# Patient Record
Sex: MALE | Race: BLACK OR AFRICAN AMERICAN | NOT HISPANIC OR LATINO | Employment: FULL TIME | URBAN - METROPOLITAN AREA
[De-identification: names, ages, dates, MRNs, and addresses within clinical notes are randomized per-mention and may not be internally consistent; named-entity substitution may affect disease eponyms.]

---

## 2019-12-23 ENCOUNTER — RECORDS - HEALTHEAST (OUTPATIENT)
Dept: LAB | Facility: CLINIC | Age: 34
End: 2019-12-23

## 2019-12-26 LAB
GAMMA INTERFERON BACKGROUND BLD IA-ACNC: 0.14 IU/ML
M TB IFN-G BLD-IMP: NEGATIVE
MITOGEN IGNF BCKGRD COR BLD-ACNC: 0.17 IU/ML
MITOGEN IGNF BCKGRD COR BLD-ACNC: 0.2 IU/ML
QTF INTERPRETATION: NORMAL
QTF MITOGEN - NIL: 6.81 IU/ML

## 2024-05-31 ENCOUNTER — LAB REQUISITION (OUTPATIENT)
Dept: LAB | Facility: CLINIC | Age: 39
End: 2024-05-31

## 2024-05-31 PROCEDURE — 36415 COLL VENOUS BLD VENIPUNCTURE: CPT | Performed by: INTERNAL MEDICINE

## 2024-05-31 PROCEDURE — 86481 TB AG RESPONSE T-CELL SUSP: CPT | Performed by: INTERNAL MEDICINE

## 2024-06-02 LAB
GAMMA INTERFERON BACKGROUND BLD IA-ACNC: 0.14 IU/ML
M TB IFN-G BLD-IMP: NEGATIVE
M TB IFN-G CD4+ BCKGRND COR BLD-ACNC: 9.86 IU/ML
MITOGEN IGNF BCKGRD COR BLD-ACNC: 0.07 IU/ML
MITOGEN IGNF BCKGRD COR BLD-ACNC: 0.2 IU/ML
QUANTIFERON MITOGEN: 10 IU/ML
QUANTIFERON NIL TUBE: 0.14 IU/ML
QUANTIFERON TB1 TUBE: 0.34 IU/ML
QUANTIFERON TB2 TUBE: 0.21

## 2024-12-03 NOTE — PROGRESS NOTES
"HPI:  Patient presents for evaluation of bump on right upper lid. The bump on the right upper lid has been present for 5 months. There is no pain. There is some discharge coming out of the bump right eye. The patient had a full exam at [facility] 3 months ago - was told the bump was \"abnormal.\"      Pertinent Medical History:  N/A    Ocular History:  None    Eye Medications:  None    Assessment and Plan:    #   Ulcerating Lesion, right upper lid.   Warm compress (with dry eye mask), 4 times daily, right eye. Then decrease to 4 times daily.  Erythromycin 4 times daily, on the eyelid, right upper lid. Start 01/02/2025.    #   Central Corneal Scar, left eye.   Sees 20/20 right eye and 20/20 left eye.  Though scar is likely mildly visually significant given central scarring (very faint).      #   Dry Eye Syndrome, both eyes.  Symptoms of dry eyes include blurry vision, eye pain, grittiness, burning, redness, eye irritation, light sensitivity, and tearing. The goal is not to eliminate, but to improve symptoms.   Preservative free artificial tears 4 times daily both eyes. Refresh or Systane. Can use up to every 2 hours both eyes as needed.    Keep eye drops and ointments 5 minutes apart.           Patient consented to a dilated eye exam:    Yes. Side effects discussed.    Medical History:  No past medical history on file.    Medications:  No current outpatient medications on file.   Complete documentation of historical and exam elements from today's encounter can be found in the full encounter summary report (not reduplicated in this progress note). I personally obtained the chief complaint(s) and history of present illness.  I confirmed and edited as necessary the review of systems, past medical/surgical history, family history, social history, and examination findings as documented by others; and I examined the patient myself. I personally reviewed the relevant tests, images, and reports as documented above. I " formulated and edited as necessary the assessment and plan and discussed the findings and management plan with the patient and family. - Andrea Jeffery OD

## 2025-01-02 ENCOUNTER — OFFICE VISIT (OUTPATIENT)
Dept: OPHTHALMOLOGY | Facility: CLINIC | Age: 40
End: 2025-01-02
Attending: OPTOMETRIST
Payer: COMMERCIAL

## 2025-01-02 DIAGNOSIS — H04.123 DRY EYE SYNDROME OF BOTH EYES: ICD-10-CM

## 2025-01-02 DIAGNOSIS — H17.9 CORNEAL SCAR, LEFT EYE: ICD-10-CM

## 2025-01-02 DIAGNOSIS — H02.9 LESION OF EYELID: Primary | ICD-10-CM

## 2025-01-02 PROCEDURE — 99211 OFF/OP EST MAY X REQ PHY/QHP: CPT | Performed by: OPTOMETRIST

## 2025-01-02 RX ORDER — ERYTHROMYCIN 5 MG/G
0.5 OINTMENT OPHTHALMIC 4 TIMES DAILY
Qty: 10 G | Refills: 3 | Status: SHIPPED | OUTPATIENT
Start: 2025-01-02 | End: 2025-01-16

## 2025-01-02 RX ORDER — NEOMYCIN SULFATE, POLYMYXIN B SULFATE, AND DEXAMETHASONE 3.5; 10000; 1 MG/G; [USP'U]/G; MG/G
0.5 OINTMENT OPHTHALMIC 4 TIMES DAILY
Qty: 10 G | Refills: 1 | Status: CANCELLED | OUTPATIENT
Start: 2025-01-02 | End: 2025-01-16

## 2025-01-02 RX ORDER — CARBOXYMETHYLCELLULOSE SODIUM 5 MG/ML
1 SOLUTION/ DROPS OPHTHALMIC 4 TIMES DAILY
Qty: 400 EACH | Refills: 11 | Status: SHIPPED | OUTPATIENT
Start: 2025-01-02

## 2025-01-02 ASSESSMENT — CONF VISUAL FIELD
OD_INFERIOR_NASAL_RESTRICTION: 0
OS_INFERIOR_TEMPORAL_RESTRICTION: 0
OD_SUPERIOR_NASAL_RESTRICTION: 0
OS_INFERIOR_NASAL_RESTRICTION: 0
OD_SUPERIOR_TEMPORAL_RESTRICTION: 0
OS_SUPERIOR_TEMPORAL_RESTRICTION: 0
OD_INFERIOR_TEMPORAL_RESTRICTION: 0
OD_NORMAL: 1
METHOD: COUNTING FINGERS
OS_NORMAL: 1
OS_SUPERIOR_NASAL_RESTRICTION: 0

## 2025-01-02 ASSESSMENT — TONOMETRY
IOP_METHOD: TONOPEN
OD_IOP_MMHG: 13
OS_IOP_MMHG: 13

## 2025-01-02 ASSESSMENT — REFRACTION_MANIFEST
OD_CYLINDER: SPHERE
OS_SPHERE: PLANO
OS_CYLINDER: SPHERE
OD_SPHERE: PLANO

## 2025-01-02 ASSESSMENT — EXTERNAL EXAM - LEFT EYE: OS_EXAM: NORMAL

## 2025-01-02 ASSESSMENT — VISUAL ACUITY
OS_SC: 20/20
OD_SC: 20/20
METHOD: SNELLEN - LINEAR

## 2025-01-02 ASSESSMENT — SLIT LAMP EXAM - LIDS: COMMENTS: NORMAL

## 2025-01-02 ASSESSMENT — EXTERNAL EXAM - RIGHT EYE: OD_EXAM: NORMAL

## 2025-01-02 NOTE — NURSING NOTE
Chief Complaints and History of Present Illnesses   Patient presents with    Bump on right upper lid evaluation     Chief Complaint(s) and History of Present Illness(es)       Bump on right upper lid evaluation               Comments    Patient states here for an evaluation for bump on upper eye lid of right eye. Last eye exam was about two to three months ago at [facility]. No DM. No past ocular surgeries or injuries.     Patient has noticed for the past four to five months bothered by bump on upper lid of right eye. No ocular pain currently. Some irritation, rubs it and gets some pain then. Discharge clear most of the time, but occasionally milky white. Lashes stick together at times. Does use a saline solution to clear and unstick. Does notice some blurriness when that happens right eye. Normally does not wear any correction, just clear lenses for protection at times. No floaters or flashes of light. Redness noticed front of both eyes. No vision issues left eye.    Marichuy Braun on 1/2/2025 at 8:31 AM

## 2025-01-18 ENCOUNTER — HEALTH MAINTENANCE LETTER (OUTPATIENT)
Age: 40
End: 2025-01-18

## 2025-01-27 ENCOUNTER — TELEPHONE (OUTPATIENT)
Dept: OPHTHALMOLOGY | Facility: CLINIC | Age: 40
End: 2025-01-27
Payer: COMMERCIAL

## 2025-01-27 NOTE — TELEPHONE ENCOUNTER
Worsening eye symptoms in past 1-2 weeks    Recommended coming in for an exam and offered appt tomorrow at 10 AM with Dr. Thornton at [facility] location and pt confirmed appt.    Yonis Narvaez RN 2:48 PM 01/27/25

## 2025-01-27 NOTE — TELEPHONE ENCOUNTER
[facility] Call Center    Phone Message    May a detailed message be left on voicemail: yes     Reason for Call: Symptoms or Concerns     If patient has red-flag symptoms, warm transfer to triage line    Current symptom or concern: Eye keeps watering, very red, white part of eye very red, pt has to constantly wipe eye from tearing. Discharge coming from eye. RIGHT eye.     Symptoms have been present for:  2 week(s)     Has patient previously been seen for this? Yes    By: Dr. Disla    Date: 01/02/2025    Are there any new or worsening symptoms? Yes: All symptoms have all become non stop. Pt is constantly dealing with these issue. Eye is becoming more irritated as pt has to constantly touch it.     Action Taken: Message routed to:  [facility] ([facility] Eye    Travel Screening: Not Applicable     Date of Service:

## 2025-01-28 ENCOUNTER — OFFICE VISIT (OUTPATIENT)
Dept: OPTOMETRY | Facility: CLINIC | Age: 40
End: 2025-01-28
Payer: COMMERCIAL

## 2025-01-28 DIAGNOSIS — H16.223 KERATITIS SICCA, BILATERAL: ICD-10-CM

## 2025-01-28 DIAGNOSIS — H16.203 KERATOCONJUNCTIVITIS OF BOTH EYES: Primary | ICD-10-CM

## 2025-01-28 RX ORDER — TOBRAMYCIN AND DEXAMETHASONE 3; 1 MG/ML; MG/ML
1-2 SUSPENSION/ DROPS OPHTHALMIC 3 TIMES DAILY
Qty: 5 ML | Refills: 1 | Status: SHIPPED | OUTPATIENT
Start: 2025-01-28 | End: 2025-02-11

## 2025-01-28 ASSESSMENT — CONF VISUAL FIELD
OD_SUPERIOR_TEMPORAL_RESTRICTION: 0
OD_SUPERIOR_NASAL_RESTRICTION: 0
OS_INFERIOR_NASAL_RESTRICTION: 0
OD_INFERIOR_NASAL_RESTRICTION: 0
OS_NORMAL: 1
OD_NORMAL: 1
OS_SUPERIOR_NASAL_RESTRICTION: 0
OS_INFERIOR_TEMPORAL_RESTRICTION: 0
OS_SUPERIOR_TEMPORAL_RESTRICTION: 0
OD_INFERIOR_TEMPORAL_RESTRICTION: 0

## 2025-01-28 ASSESSMENT — TONOMETRY
OD_IOP_MMHG: 08
IOP_METHOD: ICARE
OS_IOP_MMHG: 09

## 2025-01-28 ASSESSMENT — VISUAL ACUITY
METHOD: SNELLEN - LINEAR
OD_SC: 20/20-1
OS_SC: 20/20-2

## 2025-01-28 NOTE — PATIENT INSTRUCTIONS
START tobradex eyedrops 3-4x/day in both eyes. You may use this for 2-3 weeks until the eyes calm down    CONTINUE warm compresses 2x/day - see below for product recommendations. This may help the bump resolve on the right side    CONTINUE preservative free artificial tears 3-4x/day in both eyes    -----------    Artificial Tears  -iVizia  -Blink Tears  -Refresh Relieva  -Biotrue Hydration Boost  -[place]  -Systane Hydration  -Blink Triple Care  -Refresh Relieva PF Xtra    Warm compresses: Use 1-2x/day for 5-10 minutes over closed eyelids  -Andrea mask  -Tranquileyes beaded mask  -Mibo heating pad  -[place] REST & RELIEF Eye Mask (Hot or Cold)  -I-RELIEF Therapy Mask  -OcuTherm Essentials Kit    You can also use a warm wet washcloth - however this frequently loses heat quickly and can dry your skin out a bit so we recommend any of the above re-usable beaded/gel eyemasks      To purchase these products you can look over-the-counter at [employer] or purchase online at the following websites:  -www.[employer]/  -www.[employer]

## 2025-02-11 ENCOUNTER — OFFICE VISIT (OUTPATIENT)
Dept: OPTOMETRY | Facility: CLINIC | Age: 40
End: 2025-02-11
Payer: COMMERCIAL

## 2025-02-11 DIAGNOSIS — H00.11 CHALAZION OF RIGHT UPPER EYELID: ICD-10-CM

## 2025-02-11 DIAGNOSIS — H16.223 KERATITIS SICCA, BILATERAL: Primary | ICD-10-CM

## 2025-02-11 RX ORDER — TOBRAMYCIN AND DEXAMETHASONE 3; 1 MG/ML; MG/ML
1 SUSPENSION/ DROPS OPHTHALMIC DAILY
Qty: 5 ML | Refills: 0 | Status: SHIPPED | OUTPATIENT
Start: 2025-02-11 | End: 2025-02-25

## 2025-02-11 RX ORDER — ERYTHROMYCIN 5 MG/G
0.5 OINTMENT OPHTHALMIC AT BEDTIME
Qty: 3.5 G | Refills: 5 | Status: SHIPPED | OUTPATIENT
Start: 2025-02-11

## 2025-02-11 ASSESSMENT — CONF VISUAL FIELD
METHOD: COUNTING FINGERS
OD_INFERIOR_TEMPORAL_RESTRICTION: 0
OS_INFERIOR_NASAL_RESTRICTION: 0
OS_SUPERIOR_TEMPORAL_RESTRICTION: 0
OS_INFERIOR_TEMPORAL_RESTRICTION: 0
OD_SUPERIOR_TEMPORAL_RESTRICTION: 0
OS_SUPERIOR_NASAL_RESTRICTION: 0
OD_INFERIOR_NASAL_RESTRICTION: 0
OD_SUPERIOR_NASAL_RESTRICTION: 0
OD_NORMAL: 1
OS_NORMAL: 1

## 2025-02-11 ASSESSMENT — TONOMETRY
OS_IOP_MMHG: 11
IOP_METHOD: ICARE
OD_IOP_MMHG: 13

## 2025-02-11 ASSESSMENT — SLIT LAMP EXAM - LIDS: COMMENTS: NORMAL

## 2025-02-11 ASSESSMENT — VISUAL ACUITY
OS_SC: 20/20
OD_SC: 20/20
METHOD: SNELLEN - LINEAR

## 2025-02-11 NOTE — PATIENT INSTRUCTIONS
DECREASE tobradex eye drop to 1x/day in the right eye for 2 more weeks, then stop    START Erythromycin ointment at bedtime    CONTINUE warm compresses 1-2x per day, until your next appointment with the oculoplastics doctors. See below for more details    ---------    Warm compresses: Use 1-2x/day for 5-10 minutes over closed eyelids  -Andrea mask  -Tranquileyes beaded mask  -Mibo heating pad  -[place] REST & RELIEF Eye Mask (Hot or Cold)  -I-RELIEF Therapy Mask  -OcuTherm Essentials Kit    You can also use a warm wet washcloth - however this frequently loses heat quickly and can dry your skin out a bit so we recommend any of the above re-usable beaded/gel eyemasks      To purchase these products you can look over-the-counter at [employer] or purchase online at the following websites:  -www.9+/  -www.[employer]

## 2025-02-11 NOTE — PROGRESS NOTES
A/P  1.) Keratoconjunctivitis right eye>>left eye  -Significantly improved symptoms. Left eye resolved. Right eye with mild inf K stain only, likely related to nocturnal exposure given significant AM symptoms  -Decrease Tobradex to every day right eye x 2 weeks then stop  -Start e-mycin diane at bedtime right eye. Rinse in AM if needed  -Continue AT. PFAT 4+x/day OU    2.) Chalazion right eye  -Firm lesion RUL temporal x several months. New ulcerated center compared to last exam with addition of warm compresses. Has not done as often last week  -Continue to rec aggressive warm compresses to see if this can break it up further  -Already has oculoplastics appt for March for eval/possible excision    Follow-up prn if symptoms worsen or do not improve    I have confirmed the patient's CC, HPI and reviewed Past Medical History, Past Surgical History, Social History, Family History, Problem List, Medication List and agree with Tech note.     Zoila Thornton, KARLA LOPEZO [facility]

## 2025-03-17 ENCOUNTER — OFFICE VISIT (OUTPATIENT)
Dept: OPHTHALMOLOGY | Facility: CLINIC | Age: 40
End: 2025-03-17
Payer: COMMERCIAL

## 2025-03-17 DIAGNOSIS — H00.11 CHALAZION OF RIGHT UPPER EYELID: Primary | ICD-10-CM

## 2025-03-17 ASSESSMENT — VISUAL ACUITY
OD_SC: 20/20
METHOD: SNELLEN - LINEAR
OD_SC+: -1
OS_SC: 20/20
OS_SC+: -1

## 2025-03-17 ASSESSMENT — TONOMETRY
OD_IOP_MMHG: 11
OS_IOP_MMHG: 12
IOP_METHOD: ICARE

## 2025-03-17 ASSESSMENT — SLIT LAMP EXAM - LIDS: COMMENTS: NORMAL

## 2025-03-17 ASSESSMENT — EXTERNAL EXAM - RIGHT EYE: OD_EXAM: NORMAL

## 2025-03-17 NOTE — NURSING NOTE
"Chief Complaints and History of Present Illnesses   Patient presents with    Consult For     Tino ISAC Garner is being seen for a consult today by the request of Dr. Thornton for Ulcerating RUL lesion.      Chief Complaint(s) and History of Present Illness(es)       Consult For              Associated symptoms: swelling.  Negative for dryness, eye pain and foreign body sensation    Treatments tried: ointment    Pain scale: 0/10    Comments: Tino Garner is being seen for a consult today by the request of Dr. Thornton for Ulcerating RUL lesion.               Comments    Pt states lump appeared RUL 6 months ago.   No eye pain or discomfort.   2 days ago he feels it drained and \"disappeared\"   No vision changes.    Ocular Meds:   Erythromycin BID right eye (not the last 2 days)    Eh Simms 9:03 AM March 17, 2025                    "

## 2025-03-17 NOTE — LETTER
3/17/2025         RE:  :  MRN: Tino Garner  1985  [id number]     Dear Zoila,    Thank you for asking me to see your patient, Tino Garner, for an oculoplastic   consultation.  My assessment and plan are below.  For further details, please see my attached clinic note.      Assessment & Plan     Tino Garner is a 40 year old male with the following diagnoses:   1. Chalazion of right upper eyelid       Chalazion RUL present for several months, drained with frequent WC and ointment use a few days ago.     Exam shows lesion with overlying scab and crust at lateral RUL    Resolving chalazion RUL, recommend erythromycin to lateral RUL skin BID until tube runs out then stop. Recommend continuing WC to help with resolving swelling.  Continue AT for dry eye each eye      Follow up with oculoplastics as needed        Again, thank you for allowing me to participate in the care of your patient.      Sincerely,    Michael Vega MD  Department of Ophthalmology and Visual Neurosciences  [facility]    CC: Zoila Thornton, OD  [address]  [facility] [address]  Via In Basket

## 2025-03-17 NOTE — PROGRESS NOTES
"Chief Complaints and History of Present Illnesses   Patient presents with    Consult For     Tino Garner is being seen for a consult today by the request of Dr. Thornton for Ulcerating RUL lesion.      Chief Complaint(s) and History of Present Illness(es)     Consult For    Associated symptoms include swelling.  Negative for dryness, eye pain and   foreign body sensation.  Treatments tried include ointment.  Pain was   noted as 0/10. Additional comments: Tino Garner is being seen for a   consult today by the request of Dr. Thornton for Ulcerating RUL lesion.     Comments    Pt states lump appeared RUL 6 months ago.   No eye pain or discomfort.   2 days ago he feels it drained and \"disappeared\"   No vision changes.    Ocular Meds:   Erythromycin BID right eye (not the last 2 days)    Eh Simms 9:03 AM March 17, 2025         Assessment & Plan     Tino Garner is a 40 year old male with the following diagnoses:   1. Chalazion of right upper eyelid       Chalazion RUL present for several months, drained with frequent WC and ointment use a few days ago.     Exam shows lesion with overlying scab and crust at lateral RUL    Resolving chalazion RUL, recommend erythromycin to lateral RUL skin BID until tube runs out then stop. Recommend continuing WC to help with resolving swelling.  Continue AT for dry eye each eye      Follow up with oculoplastics as needed          Carli Cabrera MD  Oculoplastic Surgery Fellow    Attending Physician Attestation:  I have seen and examined this patient with the fellow .  I have confirmed and edited as necessary the chief complaint(s), history of present illness, review of systems, relevant history, and examination findings as documented by others.  I have personally reviewed the relevant tests, images, and reports as documented above.  I have confirmed and edited as necessary the assessment and plan and agree with this note.    - Michael Vega MD 9:35 AM 3/17/2025      "

## 2025-03-17 NOTE — PATIENT INSTRUCTIONS
"Instructions for your chalazion / chalazia:  Most chalazia will resolve with treatment at home using warm compresses and massage to soften and drain them.  Follow these steps twice a day:     1.  Soak the eyelids for ten minutes with a hot wet cloth -- as hot as you can stand but not so hot that you burn yourself.  An easy way to make a long-lasting warm compress is to wrap a boiled egg or potato in a wet washcloth.  If you use the microwave to heat anything, be VERY CAREFUL that it is not too hot as microwaved foods and cloths can have very uneven hot spots that pose a burn hazard.       2.  After the eyelids are soft and refreshed from the hot compress, clean the debris from the glands at the bases of the eyelashes.  With a warm wet washcloth wrapped around your index finger, use the tip of your finger to vigorously scrub the bases of the eyelashes.  The principle is similar to brushing your teeth but here you can use a side-to-side motion.  Perform ten strokes per eyelid across the entire length of the eyelid. You can use plain water for this brushing but many patients claim better results if they use a dilute solution of one capful of Sukh's Baby Shampoo in a glass of water.  This cleaning dislodges and removes the caked-in secretions in the gland and debris on the eyelids.  Do NOT wash the EYEBALL.     3.  If you have been prescribed an ointment, rub it on the eyelashes now.  Do NOT use Visine, Clear Eyes, or any \"anti-redness\" eye drops.  These can worsen your eye redness and irritation over time.     4.  Remember:  chalazia may take many WEEKS TO MONTHS to go away...so, hang in there and keep up with the compresses and scrubs!     5.  Diet & Supplements:  Modifying your diet helps reduce the chance of developing chalazia and possibly acne in some individuals.  This includes:  Avoid or decrease your intake of coffee, chocolate, refined sugars, and fried or processed foods. (Reduce gluten, breads, pastas, " and processed foods.)  Increase consumption of vegetables and fruits, fresh or lightly cooked. There is evidence  That Omega 3 supplementation will help as well. These are available at the local pharmacy or health food store. Dietary supplements with omega-3 fatty acids thin and decrease the inflammatory potential of the eyelid duct secretions decreasing your chance for recurrent chalazia in the future.  Omega-3 supplements are available from flax seeds, flax seed oil, or purified fish oil.  Supplement 500 - 1,500 mg of fish and/or flax seed oil daily for pre-adolescent children and 1,000 - 2,000 mg daily for adolescents and adults.  If you have any bleeding or cardiovascular problems or take prescription blood thinners, consult with your primary care physician before starting omega-3 supplements.      6.  Finally, if the chalazia persist despite following all the measures above consistently for at least 2 months, we can consider surgical removal.  In adults, this is performed as a 15 minute office procedure under local anesthesia. This necessitates general anesthesia in children, which we would much prefer to avoid if possible; so, again, please be diligent and patient with the above regimen.     7. If you have any questions please do not hesitate to contact us at [phone].      Artificial Tears 4-5x/day:  Ivizia  Systane  Refresh  Tears Naturale  Genteal  Optive  Soothe  Hypotears    All of these are good products.    DO NOT USE VISINE OR CLEAR EYES!